# Patient Record
Sex: FEMALE | Race: WHITE | NOT HISPANIC OR LATINO | Employment: UNEMPLOYED | URBAN - METROPOLITAN AREA
[De-identification: names, ages, dates, MRNs, and addresses within clinical notes are randomized per-mention and may not be internally consistent; named-entity substitution may affect disease eponyms.]

---
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
## 2018-07-01 PROBLEM — J02.9 PHARYNGITIS: Status: ACTIVE | Noted: 2018-07-01

## 2018-07-01 PROCEDURE — 87070 CULTURE OTHR SPECIMN AEROBIC: CPT | Performed by: NURSE PRACTITIONER

## 2018-07-03 ENCOUNTER — TELEPHONE (OUTPATIENT)
Dept: URGENT CARE | Facility: CLINIC | Age: 18
End: 2018-07-03

## 2020-08-03 ENCOUNTER — LAB (OUTPATIENT)
Dept: LAB | Facility: HOSPITAL | Age: 20
End: 2020-08-03

## 2020-08-03 ENCOUNTER — OFFICE VISIT (OUTPATIENT)
Dept: INTERNAL MEDICINE | Facility: CLINIC | Age: 20
End: 2020-08-03

## 2020-08-03 VITALS
TEMPERATURE: 97.8 F | HEIGHT: 64 IN | DIASTOLIC BLOOD PRESSURE: 58 MMHG | SYSTOLIC BLOOD PRESSURE: 92 MMHG | BODY MASS INDEX: 19.87 KG/M2 | HEART RATE: 76 BPM | WEIGHT: 116.4 LBS

## 2020-08-03 DIAGNOSIS — E55.9 VITAMIN D DEFICIENCY: ICD-10-CM

## 2020-08-03 DIAGNOSIS — F41.9 ANXIETY: Primary | ICD-10-CM

## 2020-08-03 DIAGNOSIS — G47.9 SLEEP DISORDER: ICD-10-CM

## 2020-08-03 DIAGNOSIS — R63.4 WEIGHT LOSS: ICD-10-CM

## 2020-08-03 DIAGNOSIS — A59.01 TRICHOMONIASIS OF VAGINA: ICD-10-CM

## 2020-08-03 DIAGNOSIS — F32.A DEPRESSION, UNSPECIFIED DEPRESSION TYPE: ICD-10-CM

## 2020-08-03 LAB
BASOPHILS # BLD AUTO: 0.03 10*3/MM3 (ref 0–0.2)
BASOPHILS NFR BLD AUTO: 0.4 % (ref 0–1.5)
DEPRECATED RDW RBC AUTO: 42.5 FL (ref 37–54)
EOSINOPHIL # BLD AUTO: 0.19 10*3/MM3 (ref 0–0.4)
EOSINOPHIL NFR BLD AUTO: 2.3 % (ref 0.3–6.2)
ERYTHROCYTE [DISTWIDTH] IN BLOOD BY AUTOMATED COUNT: 12.1 % (ref 12.3–15.4)
HCT VFR BLD AUTO: 45.1 % (ref 34–46.6)
HGB BLD-MCNC: 14.7 G/DL (ref 12–15.9)
IMM GRANULOCYTES # BLD AUTO: 0.02 10*3/MM3 (ref 0–0.05)
IMM GRANULOCYTES NFR BLD AUTO: 0.2 % (ref 0–0.5)
LYMPHOCYTES # BLD AUTO: 2.02 10*3/MM3 (ref 0.7–3.1)
LYMPHOCYTES NFR BLD AUTO: 24.6 % (ref 19.6–45.3)
MCH RBC QN AUTO: 30.3 PG (ref 26.6–33)
MCHC RBC AUTO-ENTMCNC: 32.6 G/DL (ref 31.5–35.7)
MCV RBC AUTO: 93 FL (ref 79–97)
MONOCYTES # BLD AUTO: 0.58 10*3/MM3 (ref 0.1–0.9)
MONOCYTES NFR BLD AUTO: 7.1 % (ref 5–12)
NEUTROPHILS NFR BLD AUTO: 5.38 10*3/MM3 (ref 1.7–7)
NEUTROPHILS NFR BLD AUTO: 65.4 % (ref 42.7–76)
NRBC BLD AUTO-RTO: 0 /100 WBC (ref 0–0.2)
PLATELET # BLD AUTO: 287 10*3/MM3 (ref 140–450)
PMV BLD AUTO: 11.6 FL (ref 6–12)
RBC # BLD AUTO: 4.85 10*6/MM3 (ref 3.77–5.28)
T4 FREE SERPL-MCNC: 1.2 NG/DL (ref 0.93–1.7)
TSH SERPL DL<=0.05 MIU/L-ACNC: 1.16 UIU/ML (ref 0.27–4.2)
WBC # BLD AUTO: 8.22 10*3/MM3 (ref 3.4–10.8)

## 2020-08-03 PROCEDURE — 80053 COMPREHEN METABOLIC PANEL: CPT

## 2020-08-03 PROCEDURE — 85025 COMPLETE CBC W/AUTO DIFF WBC: CPT

## 2020-08-03 PROCEDURE — 84443 ASSAY THYROID STIM HORMONE: CPT

## 2020-08-03 PROCEDURE — 99204 OFFICE O/P NEW MOD 45 MIN: CPT | Performed by: PHYSICIAN ASSISTANT

## 2020-08-03 PROCEDURE — 82306 VITAMIN D 25 HYDROXY: CPT

## 2020-08-03 PROCEDURE — 84439 ASSAY OF FREE THYROXINE: CPT

## 2020-08-03 PROCEDURE — 82607 VITAMIN B-12: CPT

## 2020-08-03 RX ORDER — BUSPIRONE HYDROCHLORIDE 10 MG/1
10 TABLET ORAL 3 TIMES DAILY
Qty: 90 TABLET | Refills: 5 | Status: SHIPPED | OUTPATIENT
Start: 2020-08-03 | End: 2020-08-05

## 2020-08-03 RX ORDER — ETONOGESTREL AND ETHINYL ESTRADIOL 11.7; 2.7 MG/1; MG/1
1 INSERT, EXTENDED RELEASE VAGINAL
COMMUNITY
End: 2022-10-12

## 2020-08-03 NOTE — PROGRESS NOTES
Patient Care Team:  Koki Ye PA-C as PCP - General (Internal Medicine)  Ángel Manzano MD as PCP - Family Medicine  Ángel Manzano MD    Chief Complaint::   Chief Complaint   Patient presents with   • Establish Care     has anxiety and depression         Subjective     HPI  20 year old female presents to establish care.  I have known her and family >20 years.   Recently graduated from [employer] 2019 and is currently not working.  She is applying for position at [employer] for [employer].   Currently going through a divorce.  She has a 12 month old daughter- currently lives with parents.    She is seeing a therapist-Rosi Rose for behavioral health. Rita has a long history of anxiety, depression, and borderline personality disorder.  She reports she has tried and failed multiple antianxiety medications, antidepressants, and antipsychotics.   Went to drug treatment in 2017 for cocaine abuse and was treated extensively for anxiety, depression, and bipolar disorder.[facility] in [place].    She has used buspirone in the past with good results.    Rita has lost approximately 30 pounds the past several months.  She states this is the lowest she has ever weighed.  She does go to the gym, but quit doing any of the cardiovascular exercise machines due to her weight loss.  She reports she is not sleeping very well and wakes up throughout the  Night.  She has not been on any psychotropic medications for several years.    Currently uses NuvaRing for birth control and cycle regulation.  Currently being treated for trichomoniasis by [facility].        The following portions of the patient's history were reviewed and updated as appropriate: active problem list, medication list, allergies, family history, social history    Review of Systems:   Review of Systems   Constitutional: Positive for activity change and appetite change. Negative for chills, diaphoresis, fatigue, fever,  "unexpected weight gain and unexpected weight loss.   HENT: Positive for congestion, rhinorrhea and sinus pressure. Negative for ear pain and hearing loss.    Eyes: Negative for visual disturbance.   Respiratory: Positive for cough. Negative for chest tightness, shortness of breath and wheezing.    Cardiovascular: Negative for chest pain, palpitations and leg swelling.   Gastrointestinal: Negative for abdominal distention, abdominal pain, blood in stool, constipation, diarrhea, nausea, GERD and indigestion.   Endocrine: Negative for cold intolerance and heat intolerance.   Genitourinary: Positive for vaginal discharge. Negative for decreased urine volume, dysuria, hematuria, urgency, urinary incontinence and vaginal bleeding.   Musculoskeletal: Negative for arthralgias and myalgias.   Skin: Negative for color change and skin lesions.   Allergic/Immunologic: Negative for environmental allergies.   Neurological: Negative for dizziness, tremors, seizures, syncope, speech difficulty, weakness, light-headedness, headache, memory problem and confusion.   Hematological: Does not bruise/bleed easily.   Psychiatric/Behavioral: Positive for sleep disturbance and depressed mood. Negative for agitation, behavioral problems, decreased concentration, dysphoric mood and suicidal ideas. The patient is nervous/anxious.        Vital Signs  Vitals:    08/03/20 1422   BP: 92/58   BP Location: Left arm   Patient Position: Sitting   Cuff Size: Adult   Pulse: 76   Temp: 97.8 °F (36.6 °C)   TempSrc: Temporal   Weight: 52.8 kg (116 lb 6.4 oz)   Height: 161.3 cm (63.5\")  Comment: per patient   PainSc: 0-No pain     Body mass index is 20.3 kg/m².    Labs  Lab on 08/03/2020   Component Date Value Ref Range Status   • TSH 08/03/2020 1.160  0.270 - 4.200 uIU/mL Final   • Free T4 08/03/2020 1.20  0.93 - 1.70 ng/dL Final   • Vitamin B-12 08/03/2020 336  211 - 946 pg/mL Final   • 25 Hydroxy, Vitamin D 08/03/2020 38.7  30.0 - 100.0 ng/ml Final   • " Glucose 08/03/2020 80  65 - 99 mg/dL Final   • BUN 08/03/2020 12  6 - 20 mg/dL Final   • Creatinine 08/03/2020 0.96  0.57 - 1.00 mg/dL Final   • Sodium 08/03/2020 137  136 - 145 mmol/L Final   • Potassium 08/03/2020 4.1  3.5 - 5.2 mmol/L Final   • Chloride 08/03/2020 100  98 - 107 mmol/L Final   • CO2 08/03/2020 25.1  22.0 - 29.0 mmol/L Final   • Calcium 08/03/2020 10.0  8.6 - 10.5 mg/dL Final   • Total Protein 08/03/2020 7.6  6.0 - 8.5 g/dL Final   • Albumin 08/03/2020 4.60  3.50 - 5.20 g/dL Final   • ALT (SGPT) 08/03/2020 14  1 - 33 U/L Final   • AST (SGOT) 08/03/2020 19  1 - 32 U/L Final   • Alkaline Phosphatase 08/03/2020 63  39 - 117 U/L Final   • Total Bilirubin 08/03/2020 0.4  0.0 - 1.2 mg/dL Final   • eGFR Non African Amer 08/03/2020 73  >60 mL/min/1.73 Final   • Globulin 08/03/2020 3.0  gm/dL Final   • A/G Ratio 08/03/2020 1.5  g/dL Final   • BUN/Creatinine Ratio 08/03/2020 12.5  7.0 - 25.0 Final   • Anion Gap 08/03/2020 11.9  5.0 - 15.0 mmol/L Final   • WBC 08/03/2020 8.22  3.40 - 10.80 10*3/mm3 Final   • RBC 08/03/2020 4.85  3.77 - 5.28 10*6/mm3 Final   • Hemoglobin 08/03/2020 14.7  12.0 - 15.9 g/dL Final   • Hematocrit 08/03/2020 45.1  34.0 - 46.6 % Final   • MCV 08/03/2020 93.0  79.0 - 97.0 fL Final   • MCH 08/03/2020 30.3  26.6 - 33.0 pg Final   • MCHC 08/03/2020 32.6  31.5 - 35.7 g/dL Final   • RDW 08/03/2020 12.1* 12.3 - 15.4 % Final   • RDW-SD 08/03/2020 42.5  37.0 - 54.0 fl Final   • MPV 08/03/2020 11.6  6.0 - 12.0 fL Final   • Platelets 08/03/2020 287  140 - 450 10*3/mm3 Final   • Neutrophil % 08/03/2020 65.4  42.7 - 76.0 % Final   • Lymphocyte % 08/03/2020 24.6  19.6 - 45.3 % Final   • Monocyte % 08/03/2020 7.1  5.0 - 12.0 % Final   • Eosinophil % 08/03/2020 2.3  0.3 - 6.2 % Final   • Basophil % 08/03/2020 0.4  0.0 - 1.5 % Final   • Immature Grans % 08/03/2020 0.2  0.0 - 0.5 % Final   • Neutrophils, Absolute 08/03/2020 5.38  1.70 - 7.00 10*3/mm3 Final   • Lymphocytes, Absolute 08/03/2020 2.02   0.70 - 3.10 10*3/mm3 Final   • Monocytes, Absolute 08/03/2020 0.58  0.10 - 0.90 10*3/mm3 Final   • Eosinophils, Absolute 08/03/2020 0.19  0.00 - 0.40 10*3/mm3 Final   • Basophils, Absolute 08/03/2020 0.03  0.00 - 0.20 10*3/mm3 Final   • Immature Grans, Absolute 08/03/2020 0.02  0.00 - 0.05 10*3/mm3 Final   • nRBC 08/03/2020 0.0  0.0 - 0.2 /100 WBC Final       Imaging  No radiology results for the last 30 days.      Current Outpatient Medications:   •  etonogestrel-ethinyl estradiol (NUVARING) 0.12-0.015 MG/24HR vaginal ring, Insert 1 each into the vagina Every 28 (Twenty-Eight) Days. Insert vaginally and leave in place for 3 consecutive weeks, then remove for 1 week., Disp: , Rfl:   •  busPIRone (BUSPAR) 10 MG tablet, Take 10 mg by mouth 3 (Three) Times a Day., Disp: , Rfl:     Physical Exam:    Physical Exam   Constitutional: She is oriented to person, place, and time. She appears well-developed and well-nourished.   HENT:   Head: Normocephalic and atraumatic.   Right Ear: Hearing, tympanic membrane, external ear and ear canal normal.   Left Ear: Hearing, tympanic membrane, external ear and ear canal normal.   Nose: Nose normal.   Mouth/Throat: Uvula is midline, oropharynx is clear and moist and mucous membranes are normal.   Eyes: Pupils are equal, round, and reactive to light. Conjunctivae, EOM and lids are normal.   Neck: Normal range of motion and full passive range of motion without pain. Neck supple.   Cardiovascular: Normal rate, regular rhythm, normal heart sounds and intact distal pulses.   Pulmonary/Chest: Effort normal and breath sounds normal.   Abdominal: Soft. Normal appearance and bowel sounds are normal.   Musculoskeletal: Normal range of motion.   Neurological: She is alert and oriented to person, place, and time. She has normal strength and normal reflexes.   Skin: Skin is warm, dry and intact.   Psychiatric: She has a normal mood and affect. Her speech is normal and behavior is normal. Judgment  and thought content normal. Cognition and memory are normal.   Vitals reviewed.      Procedures        Assessment/Plan   Problem List Items Addressed This Visit        Digestive    Vitamin D deficiency    Current Assessment & Plan     History of vitamin D deficiency.  We will check this with labs.         Relevant Orders    Vitamin D 25 Hydroxy (Completed)    CBC & Differential (Completed)       Genitourinary    Trichomoniasis of vagina    Current Assessment & Plan     Flagyl has been sent to pharmacy.  She has been instructed to start this today.            Other    Anxiety - Primary    Current Assessment & Plan     Order for GeneSight testing.  We will start with buspirone up to 3 times a day as needed.  Continue exercising daily.  We will follow-up to discuss GeneSight results and labs.         Relevant Orders    GeneSight - Swab,    Depression    Current Assessment & Plan     Continue telemedicine therapy as directed.         Relevant Medications    busPIRone (BUSPAR) 10 MG tablet    Other Relevant Orders    TSH (Completed)    T4, Free (Completed)    Vitamin B12 (Completed)    Vitamin D 25 Hydroxy (Completed)    GeneSight - Swab,    Sleep disorder    Current Assessment & Plan     Likely related to anxiety and depression.  Discussed importance of regular daily cardiovascular exercise.  Waiting GeneSight testing for guidance.         Relevant Orders    Comprehensive Metabolic Panel (Completed)    GeneSight - Swab,    Weight loss    Current Assessment & Plan     Labs today.  May be related to untreated anxiety/depression.  Encourage patient to continue exercising daily.  Encouraged her to eat healthy foods, focusing on fresh fruits and vegetables.               Return in about 2 weeks (around 8/17/2020) for Recheck.    Plan of care reviewed with patient at the conclusion of today's visit. Education was provided regarding diagnosis, management, and any prescribed or recommended OTC medications.Patient verbalizes  understanding of and agreement with management plan.       Koki Ye PA-C    Please note that portions of this note were completed with a voice recognition program. Efforts were made to edit the dictations, but occasionally words are mistranscribed.

## 2020-08-03 NOTE — ASSESSMENT & PLAN NOTE
Labs today.  May be related to untreated anxiety/depression.  Encourage patient to continue exercising daily.  Encouraged her to eat healthy foods, focusing on fresh fruits and vegetables.

## 2020-08-03 NOTE — ASSESSMENT & PLAN NOTE
Order for GeneSight testing.  We will start with buspirone up to 3 times a day as needed.  Continue exercising daily.  We will follow-up to discuss GeneSight results and labs.

## 2020-08-04 LAB
25(OH)D3 SERPL-MCNC: 38.7 NG/ML (ref 30–100)
ALBUMIN SERPL-MCNC: 4.6 G/DL (ref 3.5–5.2)
ALBUMIN/GLOB SERPL: 1.5 G/DL
ALP SERPL-CCNC: 63 U/L (ref 39–117)
ALT SERPL W P-5'-P-CCNC: 14 U/L (ref 1–33)
ANION GAP SERPL CALCULATED.3IONS-SCNC: 11.9 MMOL/L (ref 5–15)
AST SERPL-CCNC: 19 U/L (ref 1–32)
BILIRUB SERPL-MCNC: 0.4 MG/DL (ref 0–1.2)
BUN SERPL-MCNC: 12 MG/DL (ref 6–20)
BUN/CREAT SERPL: 12.5 (ref 7–25)
CALCIUM SPEC-SCNC: 10 MG/DL (ref 8.6–10.5)
CHLORIDE SERPL-SCNC: 100 MMOL/L (ref 98–107)
CO2 SERPL-SCNC: 25.1 MMOL/L (ref 22–29)
CREAT SERPL-MCNC: 0.96 MG/DL (ref 0.57–1)
GFR SERPL CREATININE-BSD FRML MDRD: 73 ML/MIN/1.73
GLOBULIN UR ELPH-MCNC: 3 GM/DL
GLUCOSE SERPL-MCNC: 80 MG/DL (ref 65–99)
POTASSIUM SERPL-SCNC: 4.1 MMOL/L (ref 3.5–5.2)
PROT SERPL-MCNC: 7.6 G/DL (ref 6–8.5)
SODIUM SERPL-SCNC: 137 MMOL/L (ref 136–145)
VIT B12 BLD-MCNC: 336 PG/ML (ref 211–946)

## 2020-08-05 NOTE — ASSESSMENT & PLAN NOTE
Likely related to anxiety and depression.  Discussed importance of regular daily cardiovascular exercise.  Waiting GeneSight testing for guidance.

## 2020-08-07 ENCOUNTER — TELEPHONE (OUTPATIENT)
Dept: INTERNAL MEDICINE | Facility: CLINIC | Age: 20
End: 2020-08-07

## 2020-08-07 NOTE — TELEPHONE ENCOUNTER
RHONDA FROM [facility] RECEIVED A SAMPLE ON PATIENT WITHOUT AN ORDER.    PLEASE SEND ORDER THROUGH THE PORTAL OR FAX -[phone].    ANY QUESTIONS CALL RHONDA AT [facility] -[phone]

## 2020-08-12 DIAGNOSIS — F32.A DEPRESSION, UNSPECIFIED DEPRESSION TYPE: ICD-10-CM

## 2020-08-12 DIAGNOSIS — F41.9 ANXIETY: ICD-10-CM

## 2020-08-12 DIAGNOSIS — G47.9 SLEEP DISORDER: ICD-10-CM

## 2020-10-21 ENCOUNTER — TELEPHONE (OUTPATIENT)
Dept: URGENT CARE | Facility: CLINIC | Age: 20
End: 2020-10-21

## 2020-10-21 PROCEDURE — U0003 INFECTIOUS AGENT DETECTION BY NUCLEIC ACID (DNA OR RNA); SEVERE ACUTE RESPIRATORY SYNDROME CORONAVIRUS 2 (SARS-COV-2) (CORONAVIRUS DISEASE [COVID-19]), AMPLIFIED PROBE TECHNIQUE, MAKING USE OF HIGH THROUGHPUT TECHNOLOGIES AS DESCRIBED BY CMS-2020-01-R: HCPCS | Performed by: FAMILY MEDICINE

## 2020-10-21 NOTE — TELEPHONE ENCOUNTER
Notified of results. Per Dr. Alcaraz amoxicillin will be called in. Pt VU to not take azithromycin and start amoxicillin and to change toothbrush after 2 days   10/21/20  mg

## 2020-10-29 ENCOUNTER — OFFICE VISIT (OUTPATIENT)
Dept: INTERNAL MEDICINE | Facility: CLINIC | Age: 20
End: 2020-10-29

## 2020-10-29 VITALS
BODY MASS INDEX: 21.4 KG/M2 | TEMPERATURE: 97.8 F | SYSTOLIC BLOOD PRESSURE: 96 MMHG | DIASTOLIC BLOOD PRESSURE: 52 MMHG | HEART RATE: 96 BPM | HEIGHT: 63 IN | WEIGHT: 120.8 LBS

## 2020-10-29 DIAGNOSIS — F41.9 ANXIETY: ICD-10-CM

## 2020-10-29 DIAGNOSIS — R63.4 WEIGHT LOSS: ICD-10-CM

## 2020-10-29 DIAGNOSIS — F32.A DEPRESSION, UNSPECIFIED DEPRESSION TYPE: Primary | ICD-10-CM

## 2020-10-29 PROCEDURE — 99214 OFFICE O/P EST MOD 30 MIN: CPT | Performed by: PHYSICIAN ASSISTANT

## 2020-10-29 RX ORDER — VILAZODONE HYDROCHLORIDE 10 MG-20MG
1 KIT ORAL DAILY
Qty: 1 KIT | Refills: 0 | COMMUNITY
Start: 2020-10-29

## 2020-10-29 RX ORDER — VILAZODONE HYDROCHLORIDE 20 MG/1
20 TABLET ORAL DAILY
Qty: 30 TABLET | Refills: 1 | Status: SHIPPED | OUTPATIENT
Start: 2020-10-29

## 2020-10-29 NOTE — PROGRESS NOTES
Patient Care Team:  Koki Ye PA-C as PCP - General (Internal Medicine)  Ángel Manzano MD as PCP - Family Medicine  Ángel Manzano MD    Chief Complaint::   Chief Complaint   Patient presents with   • Results     Genesight         Subjective     HPI  20 year old female presents for follow up of anxiety and depression.  She was last started on buspirone in August.  This medication made her feel fatigued.  She discontinued it after several weeks.  She did have GeneSight testing in August which showed favorable response to Viibryd, Pristiq, and Trintellix.  Patient reports that her mother did well with Viibryd and would be interested in trying this.  Currently going through custody diaz with her ex.  Lives at home with her mother and father and brother.  Has full-time custody over 12-month-old daughter.  She continues to take the Nuvaring.  She is compliant with Nuvaring and reports regular menses.         The following portions of the patient's history were reviewed and updated as appropriate: active problem list, medication list, allergies, family history, social history    Review of Systems:   Review of Systems   Constitutional: Negative for appetite change, chills, fatigue and fever.   HENT: Negative for congestion, ear pain and sinus pressure.    Respiratory: Negative for cough, chest tightness, shortness of breath and wheezing.    Cardiovascular: Negative for chest pain and palpitations.   Gastrointestinal: Negative for abdominal pain, blood in stool, constipation, diarrhea and nausea.   Endocrine: Negative for cold intolerance and heat intolerance.   Genitourinary: Negative for menstrual problem and pelvic pressure.   Skin: Negative for color change.   Allergic/Immunologic: Negative for environmental allergies.   Neurological: Negative for dizziness, tremors, speech difficulty, weakness, light-headedness and headache.   Psychiatric/Behavioral: Positive for dysphoric mood and sleep disturbance.  "Negative for agitation, behavioral problems, decreased concentration and suicidal ideas. The patient is not nervous/anxious.        Vital Signs  Vitals:    10/29/20 0950   BP: 96/52   BP Location: Left arm   Patient Position: Sitting   Cuff Size: Adult   Pulse: 96   Temp: 97.8 °F (36.6 °C)   Weight: 54.8 kg (120 lb 12.8 oz)   Height: 160 cm (62.99\")   PainSc: 0-No pain     Body mass index is 21.4 kg/m².    Labs  Admission on 10/21/2020, Discharged on 10/21/2020   Component Date Value Ref Range Status   • Rapid Strep A Screen 10/21/2020 Positive* Negative, VALID, INVALID, Not Performed Final   • Internal Control 10/21/2020 Passed  Passed Final   • Lot Number 10/21/2020 [id number]   Final   • Expiration Date 10/21/2020 9-10-22   Final   • SARS-CoV-2, JOSE 10/21/2020 Not Detected  Not Detected Final    Comment: This nucleic acid amplification test was developed and its performance  characteristics determined by [facility]. Nucleic acid  amplification tests include PCR and TMA. This test has not been FDA  cleared or approved. This test has been authorized by FDA under an  Emergency Use Authorization (EUA). This test is only authorized for  the duration of time the declaration that circumstances exist  justifying the authorization of the emergency use of in vitro  diagnostic tests for detection of SARS-CoV-2 virus and/or diagnosis  of COVID-19 infection under section 564(b)(1) of the Act, 21 U.S.C.  360bbb-3(b) (1), unless the authorization is terminated or revoked  sooner.  When diagnostic testing is negative, the possibility of a false  negative result should be considered in the context of a patient's  recent exposures and the presence of clinical signs and symptoms  consistent with COVID-19. An individual without symptoms of COVID-19  and who is not shedding SARS-CoV-2 virus would                            expect to have a  negative (not detected) result in this assay.   Lab on 08/03/2020   Component Date " Value Ref Range Status   • TSH 08/03/2020 1.160  0.270 - 4.200 uIU/mL Final   • Free T4 08/03/2020 1.20  0.93 - 1.70 ng/dL Final   • Vitamin B-12 08/03/2020 336  211 - 946 pg/mL Final   • 25 Hydroxy, Vitamin D 08/03/2020 38.7  30.0 - 100.0 ng/ml Final   • Glucose 08/03/2020 80  65 - 99 mg/dL Final   • BUN 08/03/2020 12  6 - 20 mg/dL Final   • Creatinine 08/03/2020 0.96  0.57 - 1.00 mg/dL Final   • Sodium 08/03/2020 137  136 - 145 mmol/L Final   • Potassium 08/03/2020 4.1  3.5 - 5.2 mmol/L Final   • Chloride 08/03/2020 100  98 - 107 mmol/L Final   • CO2 08/03/2020 25.1  22.0 - 29.0 mmol/L Final   • Calcium 08/03/2020 10.0  8.6 - 10.5 mg/dL Final   • Total Protein 08/03/2020 7.6  6.0 - 8.5 g/dL Final   • Albumin 08/03/2020 4.60  3.50 - 5.20 g/dL Final   • ALT (SGPT) 08/03/2020 14  1 - 33 U/L Final   • AST (SGOT) 08/03/2020 19  1 - 32 U/L Final   • Alkaline Phosphatase 08/03/2020 63  39 - 117 U/L Final   • Total Bilirubin 08/03/2020 0.4  0.0 - 1.2 mg/dL Final   • eGFR Non African Amer 08/03/2020 73  >60 mL/min/1.73 Final   • Globulin 08/03/2020 3.0  gm/dL Final   • A/G Ratio 08/03/2020 1.5  g/dL Final   • BUN/Creatinine Ratio 08/03/2020 12.5  7.0 - 25.0 Final   • Anion Gap 08/03/2020 11.9  5.0 - 15.0 mmol/L Final   • WBC 08/03/2020 8.22  3.40 - 10.80 10*3/mm3 Final   • RBC 08/03/2020 4.85  3.77 - 5.28 10*6/mm3 Final   • Hemoglobin 08/03/2020 14.7  12.0 - 15.9 g/dL Final   • Hematocrit 08/03/2020 45.1  34.0 - 46.6 % Final   • MCV 08/03/2020 93.0  79.0 - 97.0 fL Final   • MCH 08/03/2020 30.3  26.6 - 33.0 pg Final   • MCHC 08/03/2020 32.6  31.5 - 35.7 g/dL Final   • RDW 08/03/2020 12.1* 12.3 - 15.4 % Final   • RDW-SD 08/03/2020 42.5  37.0 - 54.0 fl Final   • MPV 08/03/2020 11.6  6.0 - 12.0 fL Final   • Platelets 08/03/2020 287  140 - 450 10*3/mm3 Final   • Neutrophil % 08/03/2020 65.4  42.7 - 76.0 % Final   • Lymphocyte % 08/03/2020 24.6  19.6 - 45.3 % Final   • Monocyte % 08/03/2020 7.1  5.0 - 12.0 % Final   • Eosinophil  % 08/03/2020 2.3  0.3 - 6.2 % Final   • Basophil % 08/03/2020 0.4  0.0 - 1.5 % Final   • Immature Grans % 08/03/2020 0.2  0.0 - 0.5 % Final   • Neutrophils, Absolute 08/03/2020 5.38  1.70 - 7.00 10*3/mm3 Final   • Lymphocytes, Absolute 08/03/2020 2.02  0.70 - 3.10 10*3/mm3 Final   • Monocytes, Absolute 08/03/2020 0.58  0.10 - 0.90 10*3/mm3 Final   • Eosinophils, Absolute 08/03/2020 0.19  0.00 - 0.40 10*3/mm3 Final   • Basophils, Absolute 08/03/2020 0.03  0.00 - 0.20 10*3/mm3 Final   • Immature Grans, Absolute 08/03/2020 0.02  0.00 - 0.05 10*3/mm3 Final   • nRBC 08/03/2020 0.0  0.0 - 0.2 /100 WBC Final       Imaging  No radiology results for the last 30 days.      Current Outpatient Medications:   •  amoxicillin (AMOXIL) 875 MG tablet, Take 1 tablet by mouth 2 (Two) Times a Day., Disp: 20 tablet, Rfl: 0  •  etonogestrel-ethinyl estradiol (NUVARING) 0.12-0.015 MG/24HR vaginal ring, Insert 1 each into the vagina Every 28 (Twenty-Eight) Days. Insert vaginally and leave in place for 3 consecutive weeks, then remove for 1 week., Disp: , Rfl:   •  vilazodone (Viibryd) 20 MG tablet tablet, Take 1 tablet by mouth Daily., Disp: 30 tablet, Rfl: 1  •  Vilazodone HCl (Viibryd Starter Pack) 10 & 20 MG kit, Take 40 mg by mouth Daily. Indications: LOT [id number] EXP 5/2022, Disp: 1 kit, Rfl: 0    Physical Exam:    Physical Exam  Vitals signs reviewed.   Constitutional:       Appearance: Normal appearance. She is well-developed.   HENT:      Head: Normocephalic and atraumatic.      Right Ear: Hearing, tympanic membrane, ear canal and external ear normal.      Left Ear: Hearing, tympanic membrane, ear canal and external ear normal.      Nose: Nose normal.      Mouth/Throat:      Pharynx: Uvula midline.   Eyes:      General: Lids are normal.      Conjunctiva/sclera: Conjunctivae normal.      Pupils: Pupils are equal, round, and reactive to light.   Neck:      Musculoskeletal: Full passive range of motion without pain, normal range of  motion and neck supple.   Cardiovascular:      Rate and Rhythm: Normal rate and regular rhythm.      Heart sounds: Normal heart sounds.   Pulmonary:      Effort: Pulmonary effort is normal.      Breath sounds: Normal breath sounds.   Abdominal:      General: Bowel sounds are normal.      Palpations: Abdomen is soft.   Musculoskeletal: Normal range of motion.   Skin:     General: Skin is warm and dry.   Neurological:      Mental Status: She is alert and oriented to person, place, and time.      Deep Tendon Reflexes: Reflexes are normal and symmetric.   Psychiatric:         Speech: Speech normal.         Behavior: Behavior normal.         Thought Content: Thought content normal.         Judgment: Judgment normal.         Procedures        Assessment/Plan   Problem List Items Addressed This Visit        Other    Anxiety    Overview     Discussed GeneSight testing in detail.  We will start Viibryd 10 mg starter pack.  Sample was given to her.  There is a prescription for 20 mg sent to pharmacy.  Discussed importance of continuing with therapist.         Relevant Medications    vilazodone (Viibryd) 20 MG tablet tablet    Vilazodone HCl (Viibryd Starter Pack) 10 & 20 MG kit    Depression - Primary    Relevant Medications    vilazodone (Viibryd) 20 MG tablet tablet    Vilazodone HCl (Viibryd Starter Pack) 10 & 20 MG kit    Weight loss    Overview     Patient reports eating regular diet and active with job.  Weight is stable.               Return in about 6 weeks (around 12/10/2020) for Recheck.    Plan of care reviewed with patient at the conclusion of today's visit. Education was provided regarding diagnosis, management, and any prescribed or recommended OTC medications.Patient verbalizes understanding of and agreement with management plan.       Koki Ye PA-C    Please note that portions of this note were completed with a voice recognition program. Efforts were made to edit the dictations, but occasionally  words are mistranscribed.

## 2020-11-23 PROCEDURE — U0004 COV-19 TEST NON-CDC HGH THRU: HCPCS | Performed by: PHYSICIAN ASSISTANT

## 2022-10-12 PROCEDURE — 87205 SMEAR GRAM STAIN: CPT | Performed by: NURSE PRACTITIONER

## 2022-10-12 PROCEDURE — 87070 CULTURE OTHR SPECIMN AEROBIC: CPT | Performed by: NURSE PRACTITIONER
